# Patient Record
Sex: MALE | Race: WHITE | NOT HISPANIC OR LATINO | URBAN - METROPOLITAN AREA
[De-identification: names, ages, dates, MRNs, and addresses within clinical notes are randomized per-mention and may not be internally consistent; named-entity substitution may affect disease eponyms.]

---

## 2017-03-22 ENCOUNTER — INPATIENT (INPATIENT)
Facility: HOSPITAL | Age: 82
LOS: 5 days | Discharge: HOME | DRG: 193 | End: 2017-03-28
Attending: INTERNAL MEDICINE | Admitting: INTERNAL MEDICINE
Payer: MEDICARE

## 2017-03-22 VITALS
HEART RATE: 98 BPM | SYSTOLIC BLOOD PRESSURE: 129 MMHG | RESPIRATION RATE: 18 BRPM | OXYGEN SATURATION: 95 % | DIASTOLIC BLOOD PRESSURE: 75 MMHG

## 2017-03-22 DIAGNOSIS — E78.5 HYPERLIPIDEMIA, UNSPECIFIED: ICD-10-CM

## 2017-03-22 DIAGNOSIS — J10.1 INFLUENZA DUE TO OTHER IDENTIFIED INFLUENZA VIRUS WITH OTHER RESPIRATORY MANIFESTATIONS: ICD-10-CM

## 2017-03-22 DIAGNOSIS — Z29.9 ENCOUNTER FOR PROPHYLACTIC MEASURES, UNSPECIFIED: ICD-10-CM

## 2017-03-22 DIAGNOSIS — N17.9 ACUTE KIDNEY FAILURE, UNSPECIFIED: ICD-10-CM

## 2017-03-22 DIAGNOSIS — Z71.89 OTHER SPECIFIED COUNSELING: ICD-10-CM

## 2017-03-22 DIAGNOSIS — R63.8 OTHER SYMPTOMS AND SIGNS CONCERNING FOOD AND FLUID INTAKE: ICD-10-CM

## 2017-03-22 DIAGNOSIS — F32.9 MAJOR DEPRESSIVE DISORDER, SINGLE EPISODE, UNSPECIFIED: ICD-10-CM

## 2017-03-22 DIAGNOSIS — E03.9 HYPOTHYROIDISM, UNSPECIFIED: ICD-10-CM

## 2017-03-22 PROCEDURE — 99285 EMERGENCY DEPT VISIT HI MDM: CPT | Mod: 25,GC

## 2017-03-22 PROCEDURE — 99497 ADVNCD CARE PLAN 30 MIN: CPT | Mod: 25

## 2017-03-22 PROCEDURE — 99223 1ST HOSP IP/OBS HIGH 75: CPT

## 2017-03-22 PROCEDURE — 93010 ELECTROCARDIOGRAM REPORT: CPT

## 2017-03-22 PROCEDURE — 71020: CPT | Mod: 26

## 2017-03-22 RX ORDER — DOCUSATE SODIUM 100 MG
100 CAPSULE ORAL THREE TIMES A DAY
Qty: 0 | Refills: 0 | Status: DISCONTINUED | OUTPATIENT
Start: 2017-03-22 | End: 2017-03-28

## 2017-03-22 RX ORDER — DIGOXIN 250 MCG
0.12 TABLET ORAL DAILY
Qty: 0 | Refills: 0 | Status: DISCONTINUED | OUTPATIENT
Start: 2017-03-22 | End: 2017-03-28

## 2017-03-22 RX ORDER — SODIUM CHLORIDE 9 MG/ML
500 INJECTION INTRAMUSCULAR; INTRAVENOUS; SUBCUTANEOUS ONCE
Qty: 0 | Refills: 0 | Status: COMPLETED | OUTPATIENT
Start: 2017-03-22 | End: 2017-03-22

## 2017-03-22 RX ORDER — SENNA PLUS 8.6 MG/1
2 TABLET ORAL AT BEDTIME
Qty: 0 | Refills: 0 | Status: DISCONTINUED | OUTPATIENT
Start: 2017-03-22 | End: 2017-03-28

## 2017-03-22 RX ORDER — LEVOTHYROXINE SODIUM 125 MCG
125 TABLET ORAL DAILY
Qty: 0 | Refills: 0 | Status: DISCONTINUED | OUTPATIENT
Start: 2017-03-22 | End: 2017-03-28

## 2017-03-22 RX ORDER — IPRATROPIUM/ALBUTEROL SULFATE 18-103MCG
3 AEROSOL WITH ADAPTER (GRAM) INHALATION EVERY 6 HOURS
Qty: 0 | Refills: 0 | Status: DISCONTINUED | OUTPATIENT
Start: 2017-03-22 | End: 2017-03-28

## 2017-03-22 RX ORDER — HEPARIN SODIUM 5000 [USP'U]/ML
5000 INJECTION INTRAVENOUS; SUBCUTANEOUS EVERY 12 HOURS
Qty: 0 | Refills: 0 | Status: DISCONTINUED | OUTPATIENT
Start: 2017-03-22 | End: 2017-03-28

## 2017-03-22 RX ORDER — ACETAMINOPHEN 500 MG
1000 TABLET ORAL ONCE
Qty: 0 | Refills: 0 | Status: COMPLETED | OUTPATIENT
Start: 2017-03-22 | End: 2017-03-22

## 2017-03-22 RX ORDER — LATANOPROST 0.05 MG/ML
1 SOLUTION/ DROPS OPHTHALMIC; TOPICAL AT BEDTIME
Qty: 0 | Refills: 0 | Status: DISCONTINUED | OUTPATIENT
Start: 2017-03-22 | End: 2017-03-28

## 2017-03-22 RX ORDER — ACETAMINOPHEN 500 MG
650 TABLET ORAL EVERY 6 HOURS
Qty: 0 | Refills: 0 | Status: DISCONTINUED | OUTPATIENT
Start: 2017-03-22 | End: 2017-03-28

## 2017-03-22 RX ORDER — PANTOPRAZOLE SODIUM 20 MG/1
40 TABLET, DELAYED RELEASE ORAL DAILY
Qty: 0 | Refills: 0 | Status: DISCONTINUED | OUTPATIENT
Start: 2017-03-22 | End: 2017-03-28

## 2017-03-22 RX ADMIN — Medication 75 MILLIGRAM(S): at 18:42

## 2017-03-22 RX ADMIN — Medication 400 MILLIGRAM(S): at 17:05

## 2017-03-22 RX ADMIN — SODIUM CHLORIDE 1000 MILLILITER(S): 9 INJECTION INTRAMUSCULAR; INTRAVENOUS; SUBCUTANEOUS at 17:05

## 2017-03-22 NOTE — ED ADULT NURSE REASSESSMENT NOTE - NS ED NURSE REASSESS COMMENT FT1
Recvd pt awake, alert and responsive to all stimuli.  tracheostomy to room air in place; no sob or respiratory distress noted.  pt denies any pain and is resting comfortably in bed awaiting admitting droplet isolation bed.  Will continue to monitor.

## 2017-03-22 NOTE — H&P ADULT. - OTHER
TTE 2014:   EF 65  onclusions:  1. Mitral annular calcification, otherwise normal mitral  valve. Minimal mitral regurgitation.  2. Calcified trileaflet aortic valve with normal opening.  No aortic valve regurgitation seen.  3. Mild aortic root dilatation  (Ao: 4.1 cm at the sinuses  of Valsalva).  4. Increased relative wall thickness with normal left  ventricular mass index, consistent with concentric left  ventricular remodeling.  5. Endocardium not well visualized; grossly normal left  ventricular systolic function.  6. Reversal of the E-A  waves of the mitral inflow pattern  is consistent with diastolic LV dysfunction.  7. Normal right ventricular size and function.

## 2017-03-22 NOTE — ED PROVIDER NOTE - OBJECTIVE STATEMENT
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 Per patient and family, patient has had thicker secretions and has not been able to clear them as well recently. States today that his aid tried both compressor and suction and a nebulizer treatment, but nothing would break up secretion. Per family, patient appears much better now with better color and speech. Denies any recent fever, chills, CP, N/V/D, numbness, paresthesias. trach last changed today. patients normal O2 sats at home at 97% on room air.  Pulm: Dr. Morin  PMD: Dr. Babb, Sulaiman  Dr. 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 Per patient and family, patient has had thicker secretions and has not been able to clear them as well recently. States today that his aid tried both compressor and suction and a nebulizer treatment, but nothing would break up secretion. Per family, patient appears much better now with better color and speech. Denies any recent fever, chills, CP, N/V/D, numbness, paresthesias. trach last changed today. patients normal O2 sats at home at 97% on room air. Recently finished Abx 1 week ago for cough.  Pulm: Dr. Morin  PMD: Dr. Babb, Sulaiman  Dr. Chen (ENT)

## 2017-03-22 NOTE — ED PROVIDER NOTE - MUSCULOSKELETAL, MLM
Globally decreased strength. Full active range of motion of all 4 extremities. Able to pull self from lying to sitting without assistance

## 2017-03-22 NOTE — ED PROVIDER NOTE - BREATH SOUNDS
dec breath sounds over R lung - no inspiratory wheezes w/ coarse expiration throughout all lobes - occasional dense productive cough during exam that patient can't clear

## 2017-03-22 NOTE — H&P ADULT. - PROBLEM SELECTOR PLAN 6
--patient confirms DNR, wants time to think about DNI and discuss with daughter  --30 minutes advanced care planning spent --patient confirms DNR, wants time to think about DNI and discuss with daughter  --patient is AOx3, has capacity to make this decision  --30 minutes advanced care planning spent

## 2017-03-22 NOTE — H&P ADULT. - NEGATIVE CARDIOVASCULAR SYMPTOMS
no orthopnea/no paroxysmal nocturnal dyspnea/no peripheral edema/no dyspnea on exertion/no chest pain/no palpitations

## 2017-03-22 NOTE — H&P ADULT. - ASSESSMENT
This is a 89 year old gentleman with hypothyroidism, HTN, CHF, colon resection, laryngeal Ca s/p XRT, vocal cord surgery and trach and bladder ca s/p chemotherapy, pulmonary embolism 13 years ago treated with AC for one year presenting with shortness of breath x 24 hours, found to be influenza positive.

## 2017-03-22 NOTE — H&P ADULT. - HISTORY OF PRESENT ILLNESS
This is a 89 year old gentleman with hypothyroidism, HTN, CHF, colon resection, laryngeal Ca s/p XRT, vocal cord surgery and trach and bladder ca s/p chemotherapy, pulmonary embolism 13 years ago treated with AC for one year presenting with shortness of breath x 24 hours.  Per patient and family, patient has had thicker secretions and has not been able to clear them as well recently. States today that his aid tried both compressor and suction and a nebulizer treatment, but nothing would break up secretion. Per family, patient appears much better now with better color and speech. Denies any recent fever, chills, CP, N/V/D, numbness, paresthesias. trach last changed today. patients normal O2 sats at home at 97% on room air. Recently finished Abx 1 week ago for cough.    In ED, HR 80-90, 110-130/60-70, afebrile, initially satting well on RA.  Labs notable for WBC 4.3, Plt 132, INR 1.19, Na 139, BUN 33, Cr 1.91, , VBG 7.37/47.  UA unremarkable, RVP + for flu.  CXR with tortuous trachea, lung fields clear.  Patient received oseltamivir, 500cc NS and IV tylenol. This is a 89 year old gentleman with hypothyroidism, HTN, CHF, colon resection, laryngeal Ca s/p XRT, vocal cord surgery and trach and bladder ca s/p chemotherapy, pulmonary embolism 13 years ago treated with AC for one year presenting with shortness of breath x 24 hours.  Patient notes that he began having symptoms of mild SOB and increased secretions 5 days PTA.  He notes a cough productive of clear sputum, though also notes that it has become more difficult to clear his secretions over the past 2 days.  At home, he tried compressor, suction and nebulizer treatments without improvement.  He denies HA, fevers, chills, chest pain, palpitations, diarrhea, constipation, rash. Notes that he has continued to take good POs.  No sick contacts.  Patient lives with long time girlfriend, also has [employer]HA 5 days a week to help with tach management and chores.  Independent in ADLs.     On arrival to patient, EMS found him to be satting 84 on RA.  Advanced to NRB.      In ED, HR 80-90, 110-130/60-70, afebrile, initially satting well on RA.  Labs notable for WBC 4.3, Plt 132, INR 1.19, Na 139, BUN 33, Cr 1.91, , VBG 7.37/47.  UA unremarkable, RVP + for flu.  CXR with tortuous trachea, lung fields clear.  Patient received oseltamivir, 500cc NS and IV tylenol.

## 2017-03-22 NOTE — ED PROVIDER NOTE - MEDICAL DECISION MAKING DETAILS
Jovon resident: 89 y/o trach patient 2/2 to radiation, HLD, Hypothyroid p/w SOB 2/2 to unable to clear secretions - found to be febrile here to 101 rectally - saturating 89% on room air - will suction, culture, labs, CXR, nebs, tylenol, and likely admit for URI vs pneumonia Marilla resident: 89 y/o trach patient 2/2 to radiation, HLD, Hypothyroid p/w SOB 2/2 to unable to clear secretions - found to be febrile here to 101 rectally - saturating 89% on room air - will suction, culture, labs, CXR, nebs, tylenol, and likely admit for URI vs pneumonia    Attending MD King: 89 male with trach sp radiation damage sp vocal cord cancer 3 years ago.  Presented to ED with difficulty breathing a fever since yesterday.  No recent hospitalization.  On exam the trach is intact however, wet secreations, lungs sound clear. No LE edema.  Rule tracheal infection vs PNA vs viral illness, pan culture, ENT consult. Likely admit.

## 2017-03-22 NOTE — ED PROVIDER NOTE - ATTENDING CONTRIBUTION TO CARE
Attending MD King:  I personally have seen and examined this patient.  Resident note reviewed and agree on plan of care and except where noted.  See MDM for details.

## 2017-03-22 NOTE — H&P ADULT. - PROBLEM SELECTOR PLAN 2
--suspect pre-renal in setting of infection  --careful fluids as above given known diastolic dysfunction.  Dry on exam.  --trend daily Cr  --daily weights, strict I/O, avoid nephrotoxins

## 2017-03-22 NOTE — H&P ADULT. - PROBLEM SELECTOR PLAN 1
--patient has had symptoms for >48 hours, unlikely to benefit from tamiflu. Given hospitalziation and desaturation, will give course.    --continue symptoms control with PRN tylenol  --guaifanesin to assist with clearance of secretions  --vigorous pulmonary toilet  --duonebs q6h scheduled for now  --NS @ 125 overnight for a total of 1L

## 2017-03-22 NOTE — H&P ADULT. - LAB RESULTS AND INTERPRETATION
Labs personally reviewed.   Labs notable for WBC 4.3, Plt 132, INR 1.19, Na 139, BUN 33, Cr 1.91, , VBG 7.37/47.  UA unremarkable, RVP + for flu.

## 2017-03-23 ENCOUNTER — TRANSCRIPTION ENCOUNTER (OUTPATIENT)
Age: 82
End: 2017-03-23

## 2017-03-23 PROCEDURE — 99222 1ST HOSP IP/OBS MODERATE 55: CPT

## 2017-03-23 RX ORDER — ACETAMINOPHEN 500 MG
650 TABLET ORAL ONCE
Qty: 0 | Refills: 0 | Status: COMPLETED | OUTPATIENT
Start: 2017-03-23 | End: 2017-03-23

## 2017-03-23 RX ORDER — IPRATROPIUM/ALBUTEROL SULFATE 18-103MCG
3 AEROSOL WITH ADAPTER (GRAM) INHALATION ONCE
Qty: 0 | Refills: 0 | Status: COMPLETED | OUTPATIENT
Start: 2017-03-23 | End: 2017-03-23

## 2017-03-23 RX ORDER — SODIUM CHLORIDE 9 MG/ML
1000 INJECTION INTRAMUSCULAR; INTRAVENOUS; SUBCUTANEOUS
Qty: 0 | Refills: 0 | Status: DISCONTINUED | OUTPATIENT
Start: 2017-03-23 | End: 2017-03-23

## 2017-03-23 RX ADMIN — Medication 100 MILLIGRAM(S): at 06:45

## 2017-03-23 RX ADMIN — Medication 100 MILLIGRAM(S): at 00:57

## 2017-03-23 RX ADMIN — Medication 1 DROP(S): at 21:39

## 2017-03-23 RX ADMIN — Medication 100 MILLIGRAM(S): at 14:11

## 2017-03-23 RX ADMIN — Medication 3 MILLILITER(S): at 01:24

## 2017-03-23 RX ADMIN — Medication 30 MILLIGRAM(S): at 14:10

## 2017-03-23 RX ADMIN — Medication 125 MICROGRAM(S): at 06:45

## 2017-03-23 RX ADMIN — Medication 1 DROP(S): at 23:06

## 2017-03-23 RX ADMIN — Medication 100 MILLIGRAM(S): at 21:39

## 2017-03-23 RX ADMIN — SODIUM CHLORIDE 125 MILLILITER(S): 9 INJECTION INTRAMUSCULAR; INTRAVENOUS; SUBCUTANEOUS at 06:44

## 2017-03-23 RX ADMIN — HEPARIN SODIUM 5000 UNIT(S): 5000 INJECTION INTRAVENOUS; SUBCUTANEOUS at 06:45

## 2017-03-23 RX ADMIN — Medication 3 MILLILITER(S): at 00:57

## 2017-03-23 RX ADMIN — Medication 3 MILLILITER(S): at 06:45

## 2017-03-23 RX ADMIN — LATANOPROST 1 DROP(S): 0.05 SOLUTION/ DROPS OPHTHALMIC; TOPICAL at 00:58

## 2017-03-23 RX ADMIN — Medication 3 MILLILITER(S): at 23:06

## 2017-03-23 RX ADMIN — Medication 650 MILLIGRAM(S): at 01:24

## 2017-03-23 RX ADMIN — Medication 650 MILLIGRAM(S): at 02:19

## 2017-03-23 RX ADMIN — Medication 1 DROP(S): at 00:58

## 2017-03-23 RX ADMIN — Medication 3 MILLILITER(S): at 14:09

## 2017-03-23 RX ADMIN — Medication 20 MILLIGRAM(S): at 14:09

## 2017-03-23 RX ADMIN — Medication 0.12 MILLIGRAM(S): at 06:45

## 2017-03-23 RX ADMIN — Medication 3 MILLILITER(S): at 17:52

## 2017-03-23 RX ADMIN — LATANOPROST 1 DROP(S): 0.05 SOLUTION/ DROPS OPHTHALMIC; TOPICAL at 21:39

## 2017-03-23 RX ADMIN — Medication 1 DROP(S): at 14:09

## 2017-03-23 RX ADMIN — HEPARIN SODIUM 5000 UNIT(S): 5000 INJECTION INTRAVENOUS; SUBCUTANEOUS at 17:52

## 2017-03-23 RX ADMIN — Medication 1 DROP(S): at 17:52

## 2017-03-23 RX ADMIN — PANTOPRAZOLE SODIUM 40 MILLIGRAM(S): 20 TABLET, DELAYED RELEASE ORAL at 14:11

## 2017-03-23 NOTE — DISCHARGE NOTE ADULT - PATIENT PORTAL LINK FT
“You can access the [facility] Patient Portal, offered by [facility], by registering with the following website: [email or web address]”

## 2017-03-23 NOTE — DISCHARGE NOTE ADULT - CARE PROVIDER_API CALL
James Morin (DO), Critical Care Medicine; Internal Medicine; Pulmonary Disease  [address] [address]  Phone: [phone]  Fax: [phone]

## 2017-03-23 NOTE — DISCHARGE NOTE ADULT - HOME CARE AGENCY
[facility] [phone] to arrive in home 24-72 hours from hospital discharge date.

## 2017-03-23 NOTE — DISCHARGE NOTE ADULT - ADDITIONAL INSTRUCTIONS
Follow up with your primary care physician in one week .  Take with you the discharge summary. Follow up with your primary care physician in one week. Take with you the discharge summary.  Follow up with Pulmonologist in 1-2 weeks

## 2017-03-23 NOTE — DISCHARGE NOTE ADULT - NS AS DC HF EDUCATION INSTRUCTIONS
Report weight gain of 2 or more pounds in one day or 3 or more pounds in one week, worsening shortness of breath, fatigue, weakness, increased swelling of hands and feet to primary care provider/Monitor Weight Daily/Low salt diet/Call Primary Care Provider for follow-up after discharge/Activities as tolerated

## 2017-03-23 NOTE — DISCHARGE NOTE ADULT - PLAN OF CARE
Resolving Continue with tamiflu as prescribed for 3 more days   follow up with your primary care physician in one week.  May take Robitussin to assist with clearing the secretion Continue with Paxil as prescribed  you do not make enough thyroid hormone  signs & symptoms of low levels of thyroid hormone - tired, getting cold easily, coarse or thin hair, constipation, shortness of breath, swelling, irregular periods  your doctor will do thyroid hormone blood tests at least once a year to monitor if medication dose is adequate  take your thyroid medicine as directed by your doctor & on empty stomach Continue with prescribed dose of synthroid  you do not make enough thyroid hormone  signs & symptoms of low levels of thyroid hormone - tired, getting cold easily, coarse or thin hair, constipation, shortness of breath, swelling, irregular periods  your doctor will do thyroid hormone blood tests at least once a year to monitor if medication dose is adequate  take your thyroid medicine as directed by your doctor & on empty stomach Monitor your kidney function in one week at your primary care physicians office.  Avoid toxic medication that can have harmful effects on your kidney.  Avoid over the counter medication .  Medication has to be doses by your physician based on your kidney function Continue with tamiflu as prescribed for 3 more days   follow up with your primary care physician in one week.  May take Robitussin to assist with clearing the secretion  Nebulization as needed and tracheal and oral suctioning as needed. Continue with Paxil as prescribed Continue with prescribed dose of synthroid  you do not make enough thyroid hormone  signs & symptoms of low levels of thyroid hormone - tired, getting cold easily, coarse or thin hair, constipation, shortness of breath, swelling  your doctor will do thyroid hormone blood tests at least once a year to monitor if medication dose is adequate  take your thyroid medicine as directed by your doctor & on empty stomach Follow up with your primary care physician in one week  May take Robitussin to assist with clearing the secretions Take medications for your blood pressure as recommended.  Eat a heart healthy diet that is low in saturated fats and salt, and includes whole grains, fruits, vegetables and lean protein   Exercise regularly (consult with your physician or cardiologist first); maintain a heart healthy weight.   If you smoke - quit (A resource to help you stop smoking is the [facility] – phone number [phone].). Continue to follow with your primary physician or cardiologist.   Seek medical help for dizziness, Lightheadedness, Blurry vision, Headache, Chest pain, Shortness of breath  Follow up with your medical doctor to establish long term blood pressure treatment goals. Call your Health Care provider upon arrival home to make a follow up appointment within one week.  Take all inhalers as prescribed by your Health Care Provider.  Take steroids as prescribed by your Health Care Provider.  If your cough increases infrequency and severity and/or you have shortness of breath or increased shortness of breath call your Health Care Provider.  If you develop fever, chills, night sweats, malaise, and/or change in mental status call your Health care Provider.  Nutrition is very important.  Eat small frequent meals.  Increase your activity as tolerated.  Do not stay in bed all day

## 2017-03-23 NOTE — DISCHARGE NOTE ADULT - CARE PLAN
Principal Discharge DX:	Influenza A  Goal:	Resolving  Instructions for follow-up, activity and diet:	Continue with tamiflu as prescribed for 3 more days   follow up with your primary care physician in one week.  May take Robitussin to assist with clearing the secretion  Secondary Diagnosis:	Depression  Instructions for follow-up, activity and diet:	Continue with Paxil as prescribed  you do not make enough thyroid hormone  signs & symptoms of low levels of thyroid hormone - tired, getting cold easily, coarse or thin hair, constipation, shortness of breath, swelling, irregular periods  your doctor will do thyroid hormone blood tests at least once a year to monitor if medication dose is adequate  take your thyroid medicine as directed by your doctor & on empty stomach  Secondary Diagnosis:	Acquired hypothyroidism  Instructions for follow-up, activity and diet:	Continue with prescribed dose of synthroid  you do not make enough thyroid hormone  signs & symptoms of low levels of thyroid hormone - tired, getting cold easily, coarse or thin hair, constipation, shortness of breath, swelling, irregular periods  your doctor will do thyroid hormone blood tests at least once a year to monitor if medication dose is adequate  take your thyroid medicine as directed by your doctor & on empty stomach Principal Discharge DX:	Influenza A  Goal:	Resolving  Instructions for follow-up, activity and diet:	Continue with tamiflu as prescribed for 3 more days   follow up with your primary care physician in one week.  May take Robitussin to assist with clearing the secretion  Nebulization as needed and tracheal and oral suctioning as needed.  Secondary Diagnosis:	Depression  Instructions for follow-up, activity and diet:	Continue with Paxil as prescribed  you do not make enough thyroid hormone  signs & symptoms of low levels of thyroid hormone - tired, getting cold easily, coarse or thin hair, constipation, shortness of breath, swelling, irregular periods  your doctor will do thyroid hormone blood tests at least once a year to monitor if medication dose is adequate  take your thyroid medicine as directed by your doctor & on empty stomach  Secondary Diagnosis:	Acquired hypothyroidism  Instructions for follow-up, activity and diet:	Continue with prescribed dose of synthroid  you do not make enough thyroid hormone  signs & symptoms of low levels of thyroid hormone - tired, getting cold easily, coarse or thin hair, constipation, shortness of breath, swelling, irregular periods  your doctor will do thyroid hormone blood tests at least once a year to monitor if medication dose is adequate  take your thyroid medicine as directed by your doctor & on empty stomach  Secondary Diagnosis:	JASON (acute kidney injury)  Instructions for follow-up, activity and diet:	Monitor your kidney function in one week at your primary care physicians office.  Avoid toxic medication that can have harmful effects on your kidney.  Avoid over the counter medication .  Medication has to be doses by your physician based on your kidney function Principal Discharge DX:	Influenza A  Goal:	Resolving  Instructions for follow-up, activity and diet:	Follow up with your primary care physician in one week  May take Robitussin to assist with clearing the secretions  Secondary Diagnosis:	Depression  Instructions for follow-up, activity and diet:	Continue with Paxil as prescribed  Secondary Diagnosis:	Acquired hypothyroidism  Instructions for follow-up, activity and diet:	Continue with prescribed dose of synthroid  you do not make enough thyroid hormone  signs & symptoms of low levels of thyroid hormone - tired, getting cold easily, coarse or thin hair, constipation, shortness of breath, swelling  your doctor will do thyroid hormone blood tests at least once a year to monitor if medication dose is adequate  take your thyroid medicine as directed by your doctor & on empty stomach  Secondary Diagnosis:	JASON (acute kidney injury)  Instructions for follow-up, activity and diet:	Monitor your kidney function in one week at your primary care physicians office.  Avoid toxic medication that can have harmful effects on your kidney.  Avoid over the counter medication .  Medication has to be doses by your physician based on your kidney function Principal Discharge DX:	Influenza A  Goal:	Resolving  Instructions for follow-up, activity and diet:	Follow up with your primary care physician in one week  May take Robitussin to assist with clearing the secretions  Secondary Diagnosis:	COPD exacerbation  Instructions for follow-up, activity and diet:	Call your Health Care provider upon arrival home to make a follow up appointment within one week.  Take all inhalers as prescribed by your Health Care Provider.  Take steroids as prescribed by your Health Care Provider.  If your cough increases infrequency and severity and/or you have shortness of breath or increased shortness of breath call your Health Care Provider.  If you develop fever, chills, night sweats, malaise, and/or change in mental status call your Health care Provider.  Nutrition is very important.  Eat small frequent meals.  Increase your activity as tolerated.  Do not stay in bed all day  Secondary Diagnosis:	JASON (acute kidney injury)  Instructions for follow-up, activity and diet:	Monitor your kidney function in one week at your primary care physicians office.  Avoid toxic medication that can have harmful effects on your kidney.  Avoid over the counter medication .  Medication has to be doses by your physician based on your kidney function  Secondary Diagnosis:	Essential hypertension  Instructions for follow-up, activity and diet:	Take medications for your blood pressure as recommended.  Eat a heart healthy diet that is low in saturated fats and salt, and includes whole grains, fruits, vegetables and lean protein   Exercise regularly (consult with your physician or cardiologist first); maintain a heart healthy weight.   If you smoke - quit (A resource to help you stop smoking is the [facility] Tobacco [facility] – phone number [phone].). Continue to follow with your primary physician or cardiologist.   Seek medical help for dizziness, Lightheadedness, Blurry vision, Headache, Chest pain, Shortness of breath  Follow up with your medical doctor to establish long term blood pressure treatment goals. Principal Discharge DX:	Influenza A  Goal:	Resolving  Instructions for follow-up, activity and diet:	Follow up with your primary care physician in one week  May take Robitussin to assist with clearing the secretions  Secondary Diagnosis:	COPD exacerbation  Instructions for follow-up, activity and diet:	Call your Health Care provider upon arrival home to make a follow up appointment within one week.  Take all inhalers as prescribed by your Health Care Provider.  Take steroids as prescribed by your Health Care Provider.  If your cough increases infrequency and severity and/or you have shortness of breath or increased shortness of breath call your Health Care Provider.  If you develop fever, chills, night sweats, malaise, and/or change in mental status call your Health care Provider.  Nutrition is very important.  Eat small frequent meals.  Increase your activity as tolerated.  Do not stay in bed all day  Secondary Diagnosis:	JASON (acute kidney injury)  Instructions for follow-up, activity and diet:	Monitor your kidney function in one week at your primary care physicians office.  Avoid toxic medication that can have harmful effects on your kidney.  Avoid over the counter medication .  Medication has to be doses by your physician based on your kidney function  Secondary Diagnosis:	Essential hypertension  Instructions for follow-up, activity and diet:	Take medications for your blood pressure as recommended.  Eat a heart healthy diet that is low in saturated fats and salt, and includes whole grains, fruits, vegetables and lean protein   Exercise regularly (consult with your physician or cardiologist first); maintain a heart healthy weight.   If you smoke - quit (A resource to help you stop smoking is the [facility] Tobacco [facility] – phone number 167-523-4317.). Continue to follow with your primary physician or cardiologist.   Seek medical help for dizziness, Lightheadedness, Blurry vision, Headache, Chest pain, Shortness of breath  Follow up with your medical doctor to establish long term blood pressure treatment goals. Principal Discharge DX:	Influenza A  Goal:	Resolving  Instructions for follow-up, activity and diet:	Follow up with your primary care physician in one week  May take Robitussin to assist with clearing the secretions  Secondary Diagnosis:	COPD exacerbation  Instructions for follow-up, activity and diet:	Call your Health Care provider upon arrival home to make a follow up appointment within one week.  Take all inhalers as prescribed by your Health Care Provider.  Take steroids as prescribed by your Health Care Provider.  If your cough increases infrequency and severity and/or you have shortness of breath or increased shortness of breath call your Health Care Provider.  If you develop fever, chills, night sweats, malaise, and/or change in mental status call your Health care Provider.  Nutrition is very important.  Eat small frequent meals.  Increase your activity as tolerated.  Do not stay in bed all day  Secondary Diagnosis:	JASON (acute kidney injury)  Instructions for follow-up, activity and diet:	Monitor your kidney function in one week at your primary care physicians office.  Avoid toxic medication that can have harmful effects on your kidney.  Avoid over the counter medication .  Medication has to be doses by your physician based on your kidney function  Secondary Diagnosis:	Essential hypertension  Instructions for follow-up, activity and diet:	Take medications for your blood pressure as recommended.  Eat a heart healthy diet that is low in saturated fats and salt, and includes whole grains, fruits, vegetables and lean protein   Exercise regularly (consult with your physician or cardiologist first); maintain a heart healthy weight.   If you smoke - quit (A resource to help you stop smoking is the [facility] Tobacco [facility] – phone number [phone].). Continue to follow with your primary physician or cardiologist.   Seek medical help for dizziness, Lightheadedness, Blurry vision, Headache, Chest pain, Shortness of breath  Follow up with your medical doctor to establish long term blood pressure treatment goals. Principal Discharge DX:	Influenza A  Goal:	Resolving  Instructions for follow-up, activity and diet:	Follow up with your primary care physician in one week  May take Robitussin to assist with clearing the secretions  Secondary Diagnosis:	COPD exacerbation  Instructions for follow-up, activity and diet:	Call your Health Care provider upon arrival home to make a follow up appointment within one week.  Take all inhalers as prescribed by your Health Care Provider.  Take steroids as prescribed by your Health Care Provider.  If your cough increases infrequency and severity and/or you have shortness of breath or increased shortness of breath call your Health Care Provider.  If you develop fever, chills, night sweats, malaise, and/or change in mental status call your Health care Provider.  Nutrition is very important.  Eat small frequent meals.  Increase your activity as tolerated.  Do not stay in bed all day  Secondary Diagnosis:	JASON (acute kidney injury)  Instructions for follow-up, activity and diet:	Monitor your kidney function in one week at your primary care physicians office.  Avoid toxic medication that can have harmful effects on your kidney.  Avoid over the counter medication .  Medication has to be doses by your physician based on your kidney function  Secondary Diagnosis:	Essential hypertension  Instructions for follow-up, activity and diet:	Take medications for your blood pressure as recommended.  Eat a heart healthy diet that is low in saturated fats and salt, and includes whole grains, fruits, vegetables and lean protein   Exercise regularly (consult with your physician or cardiologist first); maintain a heart healthy weight.   If you smoke - quit (A resource to help you stop smoking is the [facility] – phone number [phone].). Continue to follow with your primary physician or cardiologist.   Seek medical help for dizziness, Lightheadedness, Blurry vision, Headache, Chest pain, Shortness of breath  Follow up with your medical doctor to establish long term blood pressure treatment goals. Principal Discharge DX:	Influenza A  Goal:	Resolving  Instructions for follow-up, activity and diet:	Follow up with your primary care physician in one week  May take Robitussin to assist with clearing the secretions  Secondary Diagnosis:	COPD exacerbation  Instructions for follow-up, activity and diet:	Call your Health Care provider upon arrival home to make a follow up appointment within one week.  Take all inhalers as prescribed by your Health Care Provider.  Take steroids as prescribed by your Health Care Provider.  If your cough increases infrequency and severity and/or you have shortness of breath or increased shortness of breath call your Health Care Provider.  If you develop fever, chills, night sweats, malaise, and/or change in mental status call your Health care Provider.  Nutrition is very important.  Eat small frequent meals.  Increase your activity as tolerated.  Do not stay in bed all day  Secondary Diagnosis:	JASON (acute kidney injury)  Instructions for follow-up, activity and diet:	Monitor your kidney function in one week at your primary care physicians office.  Avoid toxic medication that can have harmful effects on your kidney.  Avoid over the counter medication .  Medication has to be doses by your physician based on your kidney function  Secondary Diagnosis:	Essential hypertension  Instructions for follow-up, activity and diet:	Take medications for your blood pressure as recommended.  Eat a heart healthy diet that is low in saturated fats and salt, and includes whole grains, fruits, vegetables and lean protein   Exercise regularly (consult with your physician or cardiologist first); maintain a heart healthy weight.   If you smoke - quit (A resource to help you stop smoking is the [facility] – phone number [phone].). Continue to follow with your primary physician or cardiologist.   Seek medical help for dizziness, Lightheadedness, Blurry vision, Headache, Chest pain, Shortness of breath  Follow up with your medical doctor to establish long term blood pressure treatment goals. Principal Discharge DX:	Influenza A  Goal:	Resolving  Instructions for follow-up, activity and diet:	Follow up with your primary care physician in one week  May take Robitussin to assist with clearing the secretions  Secondary Diagnosis:	COPD exacerbation  Instructions for follow-up, activity and diet:	Call your Health Care provider upon arrival home to make a follow up appointment within one week.  Take all inhalers as prescribed by your Health Care Provider.  Take steroids as prescribed by your Health Care Provider.  If your cough increases infrequency and severity and/or you have shortness of breath or increased shortness of breath call your Health Care Provider.  If you develop fever, chills, night sweats, malaise, and/or change in mental status call your Health care Provider.  Nutrition is very important.  Eat small frequent meals.  Increase your activity as tolerated.  Do not stay in bed all day  Secondary Diagnosis:	JASON (acute kidney injury)  Instructions for follow-up, activity and diet:	Monitor your kidney function in one week at your primary care physicians office.  Avoid toxic medication that can have harmful effects on your kidney.  Avoid over the counter medication .  Medication has to be doses by your physician based on your kidney function  Secondary Diagnosis:	Essential hypertension  Instructions for follow-up, activity and diet:	Take medications for your blood pressure as recommended.  Eat a heart healthy diet that is low in saturated fats and salt, and includes whole grains, fruits, vegetables and lean protein   Exercise regularly (consult with your physician or cardiologist first); maintain a heart healthy weight.   If you smoke - quit (A resource to help you stop smoking is the [facility] – phone number [phone].). Continue to follow with your primary physician or cardiologist.   Seek medical help for dizziness, Lightheadedness, Blurry vision, Headache, Chest pain, Shortness of breath  Follow up with your medical doctor to establish long term blood pressure treatment goals. Principal Discharge DX:	Influenza A  Goal:	Resolving  Instructions for follow-up, activity and diet:	Follow up with your primary care physician in one week  May take Robitussin to assist with clearing the secretions  Secondary Diagnosis:	COPD exacerbation  Instructions for follow-up, activity and diet:	Call your Health Care provider upon arrival home to make a follow up appointment within one week.  Take all inhalers as prescribed by your Health Care Provider.  Take steroids as prescribed by your Health Care Provider.  If your cough increases infrequency and severity and/or you have shortness of breath or increased shortness of breath call your Health Care Provider.  If you develop fever, chills, night sweats, malaise, and/or change in mental status call your Health care Provider.  Nutrition is very important.  Eat small frequent meals.  Increase your activity as tolerated.  Do not stay in bed all day  Secondary Diagnosis:	JASON (acute kidney injury)  Instructions for follow-up, activity and diet:	Monitor your kidney function in one week at your primary care physicians office.  Avoid toxic medication that can have harmful effects on your kidney.  Avoid over the counter medication .  Medication has to be doses by your physician based on your kidney function  Secondary Diagnosis:	Essential hypertension  Instructions for follow-up, activity and diet:	Take medications for your blood pressure as recommended.  Eat a heart healthy diet that is low in saturated fats and salt, and includes whole grains, fruits, vegetables and lean protein   Exercise regularly (consult with your physician or cardiologist first); maintain a heart healthy weight.   If you smoke - quit (A resource to help you stop smoking is the [facility] – phone number [phone].). Continue to follow with your primary physician or cardiologist.   Seek medical help for dizziness, Lightheadedness, Blurry vision, Headache, Chest pain, Shortness of breath  Follow up with your medical doctor to establish long term blood pressure treatment goals.

## 2017-03-23 NOTE — DISCHARGE NOTE ADULT - NS AS DC FOLLOWUP STROKE INST
Heart Failure/Influenza vaccination ([facility] Pub Date: August 19, 2014)/Smoking Cessation Heart Failure/Coumadin/Warfarin/Smoking Cessation Smoking Cessation/Heart Failure

## 2017-03-23 NOTE — DISCHARGE NOTE ADULT - MEDICATION SUMMARY - MEDICATIONS TO TAKE
I will START or STAY ON the medications listed below when I get home from the hospital:    predniSONE 20 mg oral tablet  -- 2 tab(s) by mouth once a day  -- Indication: For COPD exacerbation    digoxin 125 mcg (0.125 mg) oral tablet  -- 1 tab(s) by mouth once a day  -- Indication: For Essential hypertension    PARoxetine 20 mg oral tablet  -- 1 tab(s) by mouth once a day  -- Indication: For Depression    albuterol-ipratropium 2.5 mg-0.5 mg/3 mL inhalation solution  -- 3 milliliter(s) inhaled every 6 hours  -- Indication: For COPD exacerbation    senna oral tablet  -- 2 tab(s) by mouth once a day (at bedtime), As needed, Constipation  -- Indication: For COnstipation    docusate sodium 100 mg oral capsule  -- 1 cap(s) by mouth 3 times a day  -- Indication: For COnstipation    polyethylene glycol 3350 oral powder for reconstitution  -- 17 gram(s) by mouth 2 times a day  -- Indication: For COnstipation    ocular lubricant ophthalmic solution  -- 1 drop(s) to each affected eye every 3 hours  -- Indication: For glaucoma    latanoprost 0.005% ophthalmic solution  -- 1 drop(s) to each affected eye once a day (at bedtime)  -- Indication: For glaucoma    Protonix 40 mg oral granule, enteric coated  -- 1 each by mouth once a day via peg  -- Indication: For gerd    Synthroid 125 mcg (0.125 mg) oral tablet  -- 1 tab(s) by mouth once a day via peg  -- Indication: For Hypothyroidism    dextromethorphan-guaiFENesin 10 mg-100 mg/5 mL oral liquid  -- 10 milliliter(s) by mouth every 4 hours  -- Indication: For COugh I will START or STAY ON the medications listed below when I get home from the hospital:    predniSONE 20 mg oral tablet  -- 2 tab(s) by mouth once a day  -- Indication: For COPD exacerbation    digoxin 125 mcg (0.125 mg) oral tablet  -- 1 tab(s) by mouth every other day, odd days  -- Indication: For Arrythmia    digoxin 125 mcg (0.125 mg) oral tablet  -- 2 tab(s) by mouth every other day, even days  -- Indication: For Arrythmia    PARoxetine 20 mg oral tablet  -- 1 tab(s) by mouth once a day  -- Indication: For Depression    albuterol-ipratropium 2.5 mg-0.5 mg/3 mL inhalation solution  -- 3 milliliter(s) inhaled every 6 hours  -- Indication: For COPD exacerbation    polyethylene glycol 3350 oral powder for reconstitution  -- 17 gram(s) by mouth 2 times a day  -- Indication: For COnstipation    senna oral tablet  -- 2 tab(s) by mouth once a day (at bedtime), As needed, Constipation  -- Indication: For COnstipation    docusate sodium 100 mg oral capsule  -- 1 cap(s) by mouth 3 times a day  -- Indication: For COnstipation    Protonix 40 mg oral granule, enteric coated  -- 1 each by mouth once a day via peg  -- Indication: For gerd    Synthroid 125 mcg (0.125 mg) oral tablet  -- 1 tab(s) by mouth once a day via peg  -- Indication: For Hypothyroidism    dextromethorphan-guaiFENesin 10 mg-100 mg/5 mL oral liquid  -- 10 milliliter(s) by mouth every 4 hours  -- Indication: For COugh I will START or STAY ON the medications listed below when I get home from the hospital:    predniSONE 20 mg oral tablet  -- 2 tab(s) by mouth once a day  -- Indication: For COPD exacerbation    digoxin 125 mcg (0.125 mg) oral tablet  -- 1 tab(s) by mouth every other day, odd days  -- Indication: For Arrythmia    digoxin 125 mcg (0.125 mg) oral tablet  -- 2 tab(s) by mouth every other day, even days  -- Indication: For Arrythmia    PARoxetine 20 mg oral tablet  -- 1 tab(s) by mouth once a day  -- Indication: For Depression    albuterol-ipratropium 2.5 mg-0.5 mg/3 mL inhalation solution  -- 3 milliliter(s) inhaled every 6 hours  -- Indication: For COPD exacerbation    senna oral tablet  -- 2 tab(s) by mouth once a day (at bedtime), As needed, Constipation  -- Indication: For COnstipation    docusate sodium 100 mg oral capsule  -- 1 cap(s) by mouth 3 times a day  -- Indication: For COnstipation    polyethylene glycol 3350 oral powder for reconstitution  -- 17 gram(s) by mouth 2 times a day  -- Indication: For COnstipation    Synthroid 125 mcg (0.125 mg) oral tablet  -- 1 tab(s) by mouth once a day   -- Indication: For Hypothyroid    dextromethorphan-guaiFENesin 10 mg-100 mg/5 mL oral liquid  -- 10 milliliter(s) by mouth every 4 hours  -- Indication: For COugh

## 2017-03-24 LAB
ANION GAP SERPL CALC-SCNC: 10 MMOL/L — SIGNIFICANT CHANGE UP (ref 5–17)
BUN SERPL-MCNC: 27 MG/DL — HIGH (ref 7–23)
CALCIUM SERPL-MCNC: 8.6 MG/DL — SIGNIFICANT CHANGE UP (ref 8.4–10.5)
CHLORIDE SERPL-SCNC: 106 MMOL/L — SIGNIFICANT CHANGE UP (ref 96–108)
CO2 SERPL-SCNC: 25 MMOL/L — SIGNIFICANT CHANGE UP (ref 22–31)
CREAT SERPL-MCNC: 1.81 MG/DL — HIGH (ref 0.5–1.3)
CULTURE RESULTS: SIGNIFICANT CHANGE UP
GLUCOSE SERPL-MCNC: 95 MG/DL — SIGNIFICANT CHANGE UP (ref 70–99)
POTASSIUM SERPL-MCNC: 4.1 MMOL/L — SIGNIFICANT CHANGE UP (ref 3.5–5.3)
POTASSIUM SERPL-SCNC: 4.1 MMOL/L — SIGNIFICANT CHANGE UP (ref 3.5–5.3)
SODIUM SERPL-SCNC: 141 MMOL/L — SIGNIFICANT CHANGE UP (ref 135–145)
SPECIMEN SOURCE: SIGNIFICANT CHANGE UP

## 2017-03-24 PROCEDURE — 99232 SBSQ HOSP IP/OBS MODERATE 35: CPT

## 2017-03-24 RX ORDER — DIGOXIN 250 MCG
1 TABLET ORAL
Qty: 0 | Refills: 0 | COMMUNITY
Start: 2017-03-24

## 2017-03-24 RX ORDER — ACETAMINOPHEN 500 MG
650 TABLET ORAL ONCE
Qty: 0 | Refills: 0 | Status: COMPLETED | OUTPATIENT
Start: 2017-03-24 | End: 2017-03-24

## 2017-03-24 RX ORDER — DOCUSATE SODIUM 100 MG
1 CAPSULE ORAL
Qty: 0 | Refills: 0 | COMMUNITY
Start: 2017-03-24

## 2017-03-24 RX ORDER — SENNA PLUS 8.6 MG/1
2 TABLET ORAL
Qty: 0 | Refills: 0 | COMMUNITY
Start: 2017-03-24

## 2017-03-24 RX ADMIN — Medication 0.12 MILLIGRAM(S): at 05:54

## 2017-03-24 RX ADMIN — Medication 1 DROP(S): at 15:45

## 2017-03-24 RX ADMIN — Medication 1 DROP(S): at 22:41

## 2017-03-24 RX ADMIN — HEPARIN SODIUM 5000 UNIT(S): 5000 INJECTION INTRAVENOUS; SUBCUTANEOUS at 18:10

## 2017-03-24 RX ADMIN — Medication 100 MILLIGRAM(S): at 05:54

## 2017-03-24 RX ADMIN — Medication 30 MILLIGRAM(S): at 15:45

## 2017-03-24 RX ADMIN — Medication 1 DROP(S): at 02:12

## 2017-03-24 RX ADMIN — Medication 1 DROP(S): at 05:54

## 2017-03-24 RX ADMIN — Medication 20 MILLIGRAM(S): at 12:18

## 2017-03-24 RX ADMIN — Medication 125 MICROGRAM(S): at 05:55

## 2017-03-24 RX ADMIN — HEPARIN SODIUM 5000 UNIT(S): 5000 INJECTION INTRAVENOUS; SUBCUTANEOUS at 05:54

## 2017-03-24 RX ADMIN — Medication 100 MILLIGRAM(S): at 22:40

## 2017-03-24 RX ADMIN — Medication 1 DROP(S): at 18:10

## 2017-03-24 RX ADMIN — PANTOPRAZOLE SODIUM 40 MILLIGRAM(S): 20 TABLET, DELAYED RELEASE ORAL at 12:18

## 2017-03-24 RX ADMIN — Medication 1 DROP(S): at 12:17

## 2017-03-24 RX ADMIN — Medication 650 MILLIGRAM(S): at 23:44

## 2017-03-24 RX ADMIN — Medication 100 MILLIGRAM(S): at 15:45

## 2017-03-24 RX ADMIN — Medication 40 MILLIGRAM(S): at 12:16

## 2017-03-24 RX ADMIN — LATANOPROST 1 DROP(S): 0.05 SOLUTION/ DROPS OPHTHALMIC; TOPICAL at 22:40

## 2017-03-24 RX ADMIN — Medication 3 MILLILITER(S): at 05:54

## 2017-03-24 RX ADMIN — Medication 3 MILLILITER(S): at 18:10

## 2017-03-24 RX ADMIN — Medication 3 MILLILITER(S): at 12:17

## 2017-03-24 NOTE — DIETITIAN INITIAL EVALUATION ADULT. - NS AS NUTRI INTERV MEALS SNACK
Pt with PMH of CHF, however, strict diet restriction not warranted at this time given pt's current condition and age. Recommend to continue current Regular diet and add modifiers as needed. RD contact information provided.

## 2017-03-24 NOTE — DIETITIAN INITIAL EVALUATION ADULT. - ENERGY NEEDS
Height: 66 inches, Weight: 167 pounds  BMI: 27 kg/m2 IBW: 142 pounds (+/-10%), %IBW:118%  Pertinent Info: Per chart, 89 y/o male with CHF, HTN, colon resection, laryngeal cancer s/p RT, vocal cord surgery with trach, bladder cancer s/p chemotherapy, admitted with JASON and SOB secondary to influenza, on Tamiflu. No edema, no pressure ulcers noted at this time.

## 2017-03-24 NOTE — DIETITIAN INITIAL EVALUATION ADULT. - OTHER INFO
Nutrition consult received for heart failure. Pt report UBW as 170 pounds and denies any wt fluctuation for 'years'. Noted per previous RD note pt had dosing wt of 186 pounds, current dosing wt is fairly stable from reported UBW at 167 pounds. Pt reports good appetite and po intakes, noted 100% po intakes per flowsheet. No GI distress, +BM yesterday. Pt denies chewing/swallowing difficulties. NKFA. PTA takes vitamin D and vitamin E. Pt with PMH of CHF per H&P, states he was never advise to limit salt intakes, check daily wts or has been on diuretics.

## 2017-03-24 NOTE — DIETITIAN INITIAL EVALUATION ADULT. - ADHERENCE
Pt denies following any modified diet PTA, states he was never advised to avoid using salt and states 'I'm in great shape and I've been doing something right at my age'

## 2017-03-24 NOTE — DIETITIAN INITIAL EVALUATION ADULT. - PROBLEM SELECTOR PLAN 6
--patient confirms DNR, wants time to think about DNI and discuss with daughter  --patient is AOx3, has capacity to make this decision  --30 minutes advanced care planning spent

## 2017-03-24 NOTE — DIETITIAN INITIAL EVALUATION ADULT. - ORAL INTAKE PTA
Pt states he has a aid that helps out with meal preparation at home, reports good po intakes PTA/good

## 2017-03-25 LAB
ANION GAP SERPL CALC-SCNC: 16 MMOL/L — SIGNIFICANT CHANGE UP (ref 5–17)
ANISOCYTOSIS BLD QL: SLIGHT — SIGNIFICANT CHANGE UP
BASOPHILS # BLD AUTO: 0.01 K/UL — SIGNIFICANT CHANGE UP (ref 0–0.2)
BASOPHILS NFR BLD AUTO: 0.3 % — SIGNIFICANT CHANGE UP (ref 0–2)
BUN SERPL-MCNC: 30 MG/DL — HIGH (ref 7–23)
BURR CELLS BLD QL SMEAR: SLIGHT — SIGNIFICANT CHANGE UP
CALCIUM SERPL-MCNC: 8.8 MG/DL — SIGNIFICANT CHANGE UP (ref 8.4–10.5)
CHLORIDE SERPL-SCNC: 107 MMOL/L — SIGNIFICANT CHANGE UP (ref 96–108)
CO2 SERPL-SCNC: 21 MMOL/L — LOW (ref 22–31)
CREAT SERPL-MCNC: 1.55 MG/DL — HIGH (ref 0.5–1.3)
EOSINOPHIL # BLD AUTO: 0 K/UL — SIGNIFICANT CHANGE UP (ref 0–0.5)
EOSINOPHIL NFR BLD AUTO: 0 % — SIGNIFICANT CHANGE UP (ref 0–6)
GLUCOSE SERPL-MCNC: 102 MG/DL — HIGH (ref 70–99)
HCT VFR BLD CALC: 38.2 % — LOW (ref 39–50)
HGB BLD-MCNC: 12.4 G/DL — LOW (ref 13–17)
IMM GRANULOCYTES NFR BLD AUTO: 0.3 % — SIGNIFICANT CHANGE UP (ref 0–1.5)
LYMPHOCYTES # BLD AUTO: 0.92 K/UL — LOW (ref 1–3.3)
LYMPHOCYTES # BLD AUTO: 29.1 % — SIGNIFICANT CHANGE UP (ref 13–44)
MANUAL SMEAR VERIFICATION: SIGNIFICANT CHANGE UP
MCHC RBC-ENTMCNC: 26.8 PG — LOW (ref 27–34)
MCHC RBC-ENTMCNC: 32.5 GM/DL — SIGNIFICANT CHANGE UP (ref 32–36)
MCV RBC AUTO: 82.5 FL — SIGNIFICANT CHANGE UP (ref 80–100)
MONOCYTES # BLD AUTO: 0.61 K/UL — SIGNIFICANT CHANGE UP (ref 0–0.9)
MONOCYTES NFR BLD AUTO: 19.3 % — HIGH (ref 2–14)
NEUTROPHILS # BLD AUTO: 1.61 K/UL — LOW (ref 1.8–7.4)
NEUTROPHILS NFR BLD AUTO: 51 % — SIGNIFICANT CHANGE UP (ref 43–77)
PLAT MORPH BLD: NORMAL — SIGNIFICANT CHANGE UP
PLATELET # BLD AUTO: 114 K/UL — LOW (ref 150–400)
POIKILOCYTOSIS BLD QL AUTO: SLIGHT — SIGNIFICANT CHANGE UP
POTASSIUM SERPL-MCNC: 4.4 MMOL/L — SIGNIFICANT CHANGE UP (ref 3.5–5.3)
POTASSIUM SERPL-SCNC: 4.4 MMOL/L — SIGNIFICANT CHANGE UP (ref 3.5–5.3)
RBC # BLD: 4.63 M/UL — SIGNIFICANT CHANGE UP (ref 4.2–5.8)
RBC # FLD: 20 % — HIGH (ref 10.3–14.5)
RBC BLD AUTO: ABNORMAL
SODIUM SERPL-SCNC: 144 MMOL/L — SIGNIFICANT CHANGE UP (ref 135–145)
WBC # BLD: 3.16 K/UL — LOW (ref 3.8–10.5)
WBC # FLD AUTO: 3.16 K/UL — LOW (ref 3.8–10.5)

## 2017-03-25 RX ADMIN — Medication 40 MILLIGRAM(S): at 06:56

## 2017-03-25 RX ADMIN — PANTOPRAZOLE SODIUM 40 MILLIGRAM(S): 20 TABLET, DELAYED RELEASE ORAL at 13:11

## 2017-03-25 RX ADMIN — Medication 1 DROP(S): at 21:55

## 2017-03-25 RX ADMIN — Medication 100 MILLIGRAM(S): at 21:55

## 2017-03-25 RX ADMIN — Medication 0.12 MILLIGRAM(S): at 06:50

## 2017-03-25 RX ADMIN — Medication 3 MILLILITER(S): at 06:50

## 2017-03-25 RX ADMIN — Medication 20 MILLIGRAM(S): at 13:11

## 2017-03-25 RX ADMIN — Medication 1 DROP(S): at 15:34

## 2017-03-25 RX ADMIN — Medication 125 MICROGRAM(S): at 06:51

## 2017-03-25 RX ADMIN — Medication 3 MILLILITER(S): at 18:24

## 2017-03-25 RX ADMIN — Medication 100 MILLIGRAM(S): at 13:08

## 2017-03-25 RX ADMIN — HEPARIN SODIUM 5000 UNIT(S): 5000 INJECTION INTRAVENOUS; SUBCUTANEOUS at 06:51

## 2017-03-25 RX ADMIN — Medication 1 DROP(S): at 13:10

## 2017-03-25 RX ADMIN — Medication 1 DROP(S): at 09:04

## 2017-03-25 RX ADMIN — Medication 30 MILLIGRAM(S): at 13:10

## 2017-03-25 RX ADMIN — Medication 1 DROP(S): at 18:24

## 2017-03-25 RX ADMIN — Medication 650 MILLIGRAM(S): at 01:21

## 2017-03-25 RX ADMIN — Medication 3 MILLILITER(S): at 00:27

## 2017-03-25 RX ADMIN — LATANOPROST 1 DROP(S): 0.05 SOLUTION/ DROPS OPHTHALMIC; TOPICAL at 21:55

## 2017-03-25 RX ADMIN — Medication 100 MILLIGRAM(S): at 06:51

## 2017-03-25 RX ADMIN — HEPARIN SODIUM 5000 UNIT(S): 5000 INJECTION INTRAVENOUS; SUBCUTANEOUS at 18:23

## 2017-03-25 RX ADMIN — Medication 3 MILLILITER(S): at 13:07

## 2017-03-25 RX ADMIN — Medication 1 DROP(S): at 06:50

## 2017-03-26 LAB
ANION GAP SERPL CALC-SCNC: 13 MMOL/L — SIGNIFICANT CHANGE UP (ref 5–17)
BASOPHILS # BLD AUTO: 0.01 K/UL — SIGNIFICANT CHANGE UP (ref 0–0.2)
BASOPHILS NFR BLD AUTO: 0.3 % — SIGNIFICANT CHANGE UP (ref 0–2)
BUN SERPL-MCNC: 32 MG/DL — HIGH (ref 7–23)
CALCIUM SERPL-MCNC: 9.1 MG/DL — SIGNIFICANT CHANGE UP (ref 8.4–10.5)
CHLORIDE SERPL-SCNC: 103 MMOL/L — SIGNIFICANT CHANGE UP (ref 96–108)
CO2 SERPL-SCNC: 23 MMOL/L — SIGNIFICANT CHANGE UP (ref 22–31)
CREAT SERPL-MCNC: 1.55 MG/DL — HIGH (ref 0.5–1.3)
EOSINOPHIL # BLD AUTO: 0.01 K/UL — SIGNIFICANT CHANGE UP (ref 0–0.5)
EOSINOPHIL NFR BLD AUTO: 0.3 % — SIGNIFICANT CHANGE UP (ref 0–6)
GLUCOSE SERPL-MCNC: 88 MG/DL — SIGNIFICANT CHANGE UP (ref 70–99)
HCT VFR BLD CALC: 38.5 % — LOW (ref 39–50)
HGB BLD-MCNC: 12.3 G/DL — LOW (ref 13–17)
IMM GRANULOCYTES NFR BLD AUTO: 0.3 % — SIGNIFICANT CHANGE UP (ref 0–1.5)
LYMPHOCYTES # BLD AUTO: 1.09 K/UL — SIGNIFICANT CHANGE UP (ref 1–3.3)
LYMPHOCYTES # BLD AUTO: 30.9 % — SIGNIFICANT CHANGE UP (ref 13–44)
MCHC RBC-ENTMCNC: 26.4 PG — LOW (ref 27–34)
MCHC RBC-ENTMCNC: 31.9 GM/DL — LOW (ref 32–36)
MCV RBC AUTO: 82.6 FL — SIGNIFICANT CHANGE UP (ref 80–100)
MONOCYTES # BLD AUTO: 0.45 K/UL — SIGNIFICANT CHANGE UP (ref 0–0.9)
MONOCYTES NFR BLD AUTO: 12.7 % — SIGNIFICANT CHANGE UP (ref 2–14)
NEUTROPHILS # BLD AUTO: 1.96 K/UL — SIGNIFICANT CHANGE UP (ref 1.8–7.4)
NEUTROPHILS NFR BLD AUTO: 55.5 % — SIGNIFICANT CHANGE UP (ref 43–77)
PLATELET # BLD AUTO: 115 K/UL — LOW (ref 150–400)
POTASSIUM SERPL-MCNC: 4 MMOL/L — SIGNIFICANT CHANGE UP (ref 3.5–5.3)
POTASSIUM SERPL-SCNC: 4 MMOL/L — SIGNIFICANT CHANGE UP (ref 3.5–5.3)
RBC # BLD: 4.66 M/UL — SIGNIFICANT CHANGE UP (ref 4.2–5.8)
RBC # FLD: 19.9 % — HIGH (ref 10.3–14.5)
SODIUM SERPL-SCNC: 139 MMOL/L — SIGNIFICANT CHANGE UP (ref 135–145)
WBC # BLD: 3.53 K/UL — LOW (ref 3.8–10.5)
WBC # FLD AUTO: 3.53 K/UL — LOW (ref 3.8–10.5)

## 2017-03-26 RX ADMIN — Medication 1 DROP(S): at 15:15

## 2017-03-26 RX ADMIN — Medication 100 MILLIGRAM(S): at 06:46

## 2017-03-26 RX ADMIN — Medication 1 DROP(S): at 17:55

## 2017-03-26 RX ADMIN — Medication 20 MILLIGRAM(S): at 13:01

## 2017-03-26 RX ADMIN — HEPARIN SODIUM 5000 UNIT(S): 5000 INJECTION INTRAVENOUS; SUBCUTANEOUS at 06:46

## 2017-03-26 RX ADMIN — PANTOPRAZOLE SODIUM 40 MILLIGRAM(S): 20 TABLET, DELAYED RELEASE ORAL at 13:00

## 2017-03-26 RX ADMIN — Medication 0.12 MILLIGRAM(S): at 06:45

## 2017-03-26 RX ADMIN — Medication 3 MILLILITER(S): at 00:50

## 2017-03-26 RX ADMIN — Medication 100 MILLIGRAM(S): at 15:14

## 2017-03-26 RX ADMIN — Medication 1 DROP(S): at 22:48

## 2017-03-26 RX ADMIN — Medication 3 MILLILITER(S): at 17:55

## 2017-03-26 RX ADMIN — Medication 3 MILLILITER(S): at 06:45

## 2017-03-26 RX ADMIN — Medication 125 MICROGRAM(S): at 06:45

## 2017-03-26 RX ADMIN — Medication 30 MILLIGRAM(S): at 13:00

## 2017-03-26 RX ADMIN — Medication 40 MILLIGRAM(S): at 06:46

## 2017-03-26 RX ADMIN — LATANOPROST 1 DROP(S): 0.05 SOLUTION/ DROPS OPHTHALMIC; TOPICAL at 22:54

## 2017-03-26 RX ADMIN — Medication 3 MILLILITER(S): at 12:59

## 2017-03-26 RX ADMIN — Medication 1 DROP(S): at 06:45

## 2017-03-26 RX ADMIN — Medication 1 DROP(S): at 12:59

## 2017-03-26 RX ADMIN — HEPARIN SODIUM 5000 UNIT(S): 5000 INJECTION INTRAVENOUS; SUBCUTANEOUS at 17:55

## 2017-03-26 RX ADMIN — Medication 100 MILLIGRAM(S): at 22:54

## 2017-03-26 RX ADMIN — Medication 1 DROP(S): at 00:51

## 2017-03-26 RX ADMIN — Medication 1 DROP(S): at 08:48

## 2017-03-26 NOTE — PHYSICAL THERAPY INITIAL EVALUATION ADULT - ADDITIONAL COMMENTS
Lives in a pvt house with his girl friend on 1st floor, 14 steps to go up HR present, Independent in ADLs PTA.

## 2017-03-26 NOTE — PHYSICAL THERAPY INITIAL EVALUATION ADULT - PERTINENT HX OF CURRENT PROBLEM, REHAB EVAL
89 yr M with hypothyroidism, HTN, CHF, colon resection, laryngeal Ca s/p XRT, vocal cord surgery and trach and bladder ca s/p chemotherapy, pulmonary embolism 13 years ago treated with AC for one year presenting with shortness of breath x 24 hours, found to be influenza positive, Results shows decreased h/h, + bacteria in urine culture, CXR: Subsegmental linear atelectasis of the right middle lobe.

## 2017-03-27 LAB
ANION GAP SERPL CALC-SCNC: 10 MMOL/L — SIGNIFICANT CHANGE UP (ref 5–17)
BUN SERPL-MCNC: 32 MG/DL — HIGH (ref 7–23)
CALCIUM SERPL-MCNC: 8.8 MG/DL — SIGNIFICANT CHANGE UP (ref 8.4–10.5)
CHLORIDE SERPL-SCNC: 104 MMOL/L — SIGNIFICANT CHANGE UP (ref 96–108)
CO2 SERPL-SCNC: 24 MMOL/L — SIGNIFICANT CHANGE UP (ref 22–31)
CREAT SERPL-MCNC: 1.55 MG/DL — HIGH (ref 0.5–1.3)
GLUCOSE SERPL-MCNC: 93 MG/DL — SIGNIFICANT CHANGE UP (ref 70–99)
HCT VFR BLD CALC: 38.6 % — LOW (ref 39–50)
HGB BLD-MCNC: 12.4 G/DL — LOW (ref 13–17)
MCHC RBC-ENTMCNC: 26.6 PG — LOW (ref 27–34)
MCHC RBC-ENTMCNC: 32.1 GM/DL — SIGNIFICANT CHANGE UP (ref 32–36)
MCV RBC AUTO: 82.7 FL — SIGNIFICANT CHANGE UP (ref 80–100)
PLATELET # BLD AUTO: 109 K/UL — LOW (ref 150–400)
POTASSIUM SERPL-MCNC: 3.9 MMOL/L — SIGNIFICANT CHANGE UP (ref 3.5–5.3)
POTASSIUM SERPL-SCNC: 3.9 MMOL/L — SIGNIFICANT CHANGE UP (ref 3.5–5.3)
RBC # BLD: 4.67 M/UL — SIGNIFICANT CHANGE UP (ref 4.2–5.8)
RBC # FLD: 19.6 % — HIGH (ref 10.3–14.5)
SODIUM SERPL-SCNC: 138 MMOL/L — SIGNIFICANT CHANGE UP (ref 135–145)
WBC # BLD: 3.23 K/UL — LOW (ref 3.8–10.5)
WBC # FLD AUTO: 3.23 K/UL — LOW (ref 3.8–10.5)

## 2017-03-27 RX ORDER — IPRATROPIUM/ALBUTEROL SULFATE 18-103MCG
3 AEROSOL WITH ADAPTER (GRAM) INHALATION
Qty: 0 | Refills: 0 | COMMUNITY
Start: 2017-03-27

## 2017-03-27 RX ADMIN — Medication 1 DROP(S): at 21:22

## 2017-03-27 RX ADMIN — Medication 3 MILLILITER(S): at 12:37

## 2017-03-27 RX ADMIN — Medication 40 MILLIGRAM(S): at 06:59

## 2017-03-27 RX ADMIN — Medication 3 MILLILITER(S): at 17:26

## 2017-03-27 RX ADMIN — Medication 1 DROP(S): at 07:00

## 2017-03-27 RX ADMIN — Medication 3 MILLILITER(S): at 00:54

## 2017-03-27 RX ADMIN — Medication 3 MILLILITER(S): at 06:59

## 2017-03-27 RX ADMIN — Medication 20 MILLIGRAM(S): at 12:37

## 2017-03-27 RX ADMIN — HEPARIN SODIUM 5000 UNIT(S): 5000 INJECTION INTRAVENOUS; SUBCUTANEOUS at 16:32

## 2017-03-27 RX ADMIN — PANTOPRAZOLE SODIUM 40 MILLIGRAM(S): 20 TABLET, DELAYED RELEASE ORAL at 12:37

## 2017-03-27 RX ADMIN — Medication 1 DROP(S): at 16:32

## 2017-03-27 RX ADMIN — Medication 1 DROP(S): at 12:37

## 2017-03-27 RX ADMIN — Medication 100 MILLIGRAM(S): at 07:00

## 2017-03-27 RX ADMIN — Medication 100 MILLIGRAM(S): at 21:22

## 2017-03-27 RX ADMIN — HEPARIN SODIUM 5000 UNIT(S): 5000 INJECTION INTRAVENOUS; SUBCUTANEOUS at 06:59

## 2017-03-27 RX ADMIN — Medication 125 MICROGRAM(S): at 06:59

## 2017-03-27 RX ADMIN — Medication 1 DROP(S): at 08:54

## 2017-03-27 RX ADMIN — Medication 30 MILLIGRAM(S): at 12:37

## 2017-03-27 RX ADMIN — LATANOPROST 1 DROP(S): 0.05 SOLUTION/ DROPS OPHTHALMIC; TOPICAL at 21:22

## 2017-03-27 RX ADMIN — Medication 100 MILLIGRAM(S): at 12:37

## 2017-03-27 RX ADMIN — Medication 0.12 MILLIGRAM(S): at 06:59

## 2017-03-28 VITALS
HEART RATE: 73 BPM | SYSTOLIC BLOOD PRESSURE: 124 MMHG | TEMPERATURE: 98 F | DIASTOLIC BLOOD PRESSURE: 75 MMHG | OXYGEN SATURATION: 93 % | RESPIRATION RATE: 17 BRPM

## 2017-03-28 LAB
ANION GAP SERPL CALC-SCNC: 11 MMOL/L — SIGNIFICANT CHANGE UP (ref 5–17)
BUN SERPL-MCNC: 31 MG/DL — HIGH (ref 7–23)
CALCIUM SERPL-MCNC: 8.9 MG/DL — SIGNIFICANT CHANGE UP (ref 8.4–10.5)
CHLORIDE SERPL-SCNC: 102 MMOL/L — SIGNIFICANT CHANGE UP (ref 96–108)
CO2 SERPL-SCNC: 29 MMOL/L — SIGNIFICANT CHANGE UP (ref 22–31)
CREAT SERPL-MCNC: 1.74 MG/DL — HIGH (ref 0.5–1.3)
GLUCOSE SERPL-MCNC: 97 MG/DL — SIGNIFICANT CHANGE UP (ref 70–99)
POTASSIUM SERPL-MCNC: 3.9 MMOL/L — SIGNIFICANT CHANGE UP (ref 3.5–5.3)
POTASSIUM SERPL-SCNC: 3.9 MMOL/L — SIGNIFICANT CHANGE UP (ref 3.5–5.3)
SODIUM SERPL-SCNC: 142 MMOL/L — SIGNIFICANT CHANGE UP (ref 135–145)

## 2017-03-28 PROCEDURE — 84295 ASSAY OF SERUM SODIUM: CPT

## 2017-03-28 PROCEDURE — 85610 PROTHROMBIN TIME: CPT

## 2017-03-28 PROCEDURE — 85027 COMPLETE CBC AUTOMATED: CPT

## 2017-03-28 PROCEDURE — 84132 ASSAY OF SERUM POTASSIUM: CPT

## 2017-03-28 PROCEDURE — 80048 BASIC METABOLIC PNL TOTAL CA: CPT

## 2017-03-28 PROCEDURE — 93005 ELECTROCARDIOGRAM TRACING: CPT

## 2017-03-28 PROCEDURE — 87486 CHLMYD PNEUM DNA AMP PROBE: CPT

## 2017-03-28 PROCEDURE — 87040 BLOOD CULTURE FOR BACTERIA: CPT

## 2017-03-28 PROCEDURE — 87086 URINE CULTURE/COLONY COUNT: CPT

## 2017-03-28 PROCEDURE — 82803 BLOOD GASES ANY COMBINATION: CPT

## 2017-03-28 PROCEDURE — 85014 HEMATOCRIT: CPT

## 2017-03-28 PROCEDURE — 96374 THER/PROPH/DIAG INJ IV PUSH: CPT

## 2017-03-28 PROCEDURE — 82435 ASSAY OF BLOOD CHLORIDE: CPT

## 2017-03-28 PROCEDURE — 87581 M.PNEUMON DNA AMP PROBE: CPT

## 2017-03-28 PROCEDURE — 99285 EMERGENCY DEPT VISIT HI MDM: CPT | Mod: 25

## 2017-03-28 PROCEDURE — 87633 RESP VIRUS 12-25 TARGETS: CPT

## 2017-03-28 PROCEDURE — 85730 THROMBOPLASTIN TIME PARTIAL: CPT

## 2017-03-28 PROCEDURE — 80053 COMPREHEN METABOLIC PANEL: CPT

## 2017-03-28 PROCEDURE — 83605 ASSAY OF LACTIC ACID: CPT

## 2017-03-28 PROCEDURE — 82330 ASSAY OF CALCIUM: CPT

## 2017-03-28 PROCEDURE — 81001 URINALYSIS AUTO W/SCOPE: CPT

## 2017-03-28 PROCEDURE — 82947 ASSAY GLUCOSE BLOOD QUANT: CPT

## 2017-03-28 PROCEDURE — 87798 DETECT AGENT NOS DNA AMP: CPT

## 2017-03-28 PROCEDURE — 71046 X-RAY EXAM CHEST 2 VIEWS: CPT

## 2017-03-28 PROCEDURE — 97162 PT EVAL MOD COMPLEX 30 MIN: CPT

## 2017-03-28 PROCEDURE — 94640 AIRWAY INHALATION TREATMENT: CPT

## 2017-03-28 RX ORDER — LEVOTHYROXINE SODIUM 125 MCG
1 TABLET ORAL
Qty: 0 | Refills: 0 | COMMUNITY

## 2017-03-28 RX ORDER — DIGOXIN 250 MCG
2 TABLET ORAL
Qty: 0 | Refills: 0 | COMMUNITY

## 2017-03-28 RX ORDER — IPRATROPIUM/ALBUTEROL SULFATE 18-103MCG
3 AEROSOL WITH ADAPTER (GRAM) INHALATION
Qty: 1 | Refills: 0 | OUTPATIENT
Start: 2017-03-28 | End: 2017-04-27

## 2017-03-28 RX ADMIN — Medication 1 DROP(S): at 08:51

## 2017-03-28 RX ADMIN — Medication 0.12 MILLIGRAM(S): at 06:36

## 2017-03-28 RX ADMIN — Medication 1 DROP(S): at 00:11

## 2017-03-28 RX ADMIN — Medication 40 MILLIGRAM(S): at 06:36

## 2017-03-28 RX ADMIN — PANTOPRAZOLE SODIUM 40 MILLIGRAM(S): 20 TABLET, DELAYED RELEASE ORAL at 12:53

## 2017-03-28 RX ADMIN — Medication 125 MICROGRAM(S): at 06:37

## 2017-03-28 RX ADMIN — Medication 3 MILLILITER(S): at 06:36

## 2017-03-28 RX ADMIN — Medication 1 DROP(S): at 06:36

## 2017-03-28 RX ADMIN — HEPARIN SODIUM 5000 UNIT(S): 5000 INJECTION INTRAVENOUS; SUBCUTANEOUS at 06:37

## 2017-03-28 RX ADMIN — Medication 1 DROP(S): at 12:54

## 2017-03-28 RX ADMIN — Medication 100 MILLIGRAM(S): at 06:37

## 2017-03-28 RX ADMIN — Medication 3 MILLILITER(S): at 00:11

## 2017-03-28 RX ADMIN — Medication 20 MILLIGRAM(S): at 12:54

## 2017-03-28 RX ADMIN — Medication 3 MILLILITER(S): at 12:53

## 2017-04-04 ENCOUNTER — EMERGENCY (EMERGENCY)
Facility: HOSPITAL | Age: 82
LOS: 1 days | Discharge: HOME | End: 2017-04-04
Attending: EMERGENCY MEDICINE | Admitting: EMERGENCY MEDICINE
Payer: MEDICARE

## 2017-04-04 VITALS
RESPIRATION RATE: 16 BRPM | SYSTOLIC BLOOD PRESSURE: 170 MMHG | DIASTOLIC BLOOD PRESSURE: 90 MMHG | HEART RATE: 86 BPM | OXYGEN SATURATION: 100 %

## 2017-04-04 VITALS
HEART RATE: 78 BPM | SYSTOLIC BLOOD PRESSURE: 150 MMHG | OXYGEN SATURATION: 97 % | RESPIRATION RATE: 16 BRPM | DIASTOLIC BLOOD PRESSURE: 89 MMHG | TEMPERATURE: 98 F

## 2017-04-04 DIAGNOSIS — R33.9 RETENTION OF URINE, UNSPECIFIED: ICD-10-CM

## 2017-04-04 DIAGNOSIS — Z98.890 OTHER SPECIFIED POSTPROCEDURAL STATES: ICD-10-CM

## 2017-04-04 DIAGNOSIS — R30.0 DYSURIA: ICD-10-CM

## 2017-04-04 DIAGNOSIS — I50.9 HEART FAILURE, UNSPECIFIED: ICD-10-CM

## 2017-04-04 DIAGNOSIS — K59.00 CONSTIPATION, UNSPECIFIED: ICD-10-CM

## 2017-04-04 DIAGNOSIS — I11.0 HYPERTENSIVE HEART DISEASE WITH HEART FAILURE: ICD-10-CM

## 2017-04-04 DIAGNOSIS — E78.5 HYPERLIPIDEMIA, UNSPECIFIED: ICD-10-CM

## 2017-04-04 LAB
ALBUMIN SERPL ELPH-MCNC: 3.3 G/DL — SIGNIFICANT CHANGE UP (ref 3.3–5)
ALP SERPL-CCNC: 153 U/L — HIGH (ref 40–120)
ALT FLD-CCNC: 19 U/L RC — SIGNIFICANT CHANGE UP (ref 10–45)
ANION GAP SERPL CALC-SCNC: 11 MMOL/L — SIGNIFICANT CHANGE UP (ref 5–17)
APPEARANCE UR: CLEAR — SIGNIFICANT CHANGE UP
AST SERPL-CCNC: 17 U/L — SIGNIFICANT CHANGE UP (ref 10–40)
BASOPHILS # BLD AUTO: 0 K/UL — SIGNIFICANT CHANGE UP (ref 0–0.2)
BILIRUB SERPL-MCNC: 0.5 MG/DL — SIGNIFICANT CHANGE UP (ref 0.2–1.2)
BILIRUB UR-MCNC: NEGATIVE — SIGNIFICANT CHANGE UP
BUN SERPL-MCNC: 30 MG/DL — HIGH (ref 7–23)
CALCIUM SERPL-MCNC: 8.9 MG/DL — SIGNIFICANT CHANGE UP (ref 8.4–10.5)
CHLORIDE SERPL-SCNC: 103 MMOL/L — SIGNIFICANT CHANGE UP (ref 96–108)
CO2 SERPL-SCNC: 21 MMOL/L — LOW (ref 22–31)
COLOR SPEC: SIGNIFICANT CHANGE UP
CREAT SERPL-MCNC: 1.86 MG/DL — HIGH (ref 0.5–1.3)
DIFF PNL FLD: ABNORMAL
EOSINOPHIL # BLD AUTO: 0.1 K/UL — SIGNIFICANT CHANGE UP (ref 0–0.5)
EOSINOPHIL NFR BLD AUTO: 2 % — SIGNIFICANT CHANGE UP (ref 0–6)
GLUCOSE SERPL-MCNC: 106 MG/DL — HIGH (ref 70–99)
GLUCOSE UR QL: NEGATIVE — SIGNIFICANT CHANGE UP
HCT VFR BLD CALC: 41.2 % — SIGNIFICANT CHANGE UP (ref 39–50)
HGB BLD-MCNC: 13.5 G/DL — SIGNIFICANT CHANGE UP (ref 13–17)
KETONES UR-MCNC: NEGATIVE — SIGNIFICANT CHANGE UP
LEUKOCYTE ESTERASE UR-ACNC: NEGATIVE — SIGNIFICANT CHANGE UP
LYMPHOCYTES # BLD AUTO: 1.4 K/UL — SIGNIFICANT CHANGE UP (ref 1–3.3)
LYMPHOCYTES # BLD AUTO: 26 % — SIGNIFICANT CHANGE UP (ref 13–44)
MCHC RBC-ENTMCNC: 27.5 PG — SIGNIFICANT CHANGE UP (ref 27–34)
MCHC RBC-ENTMCNC: 32.6 GM/DL — SIGNIFICANT CHANGE UP (ref 32–36)
MCV RBC AUTO: 84.2 FL — SIGNIFICANT CHANGE UP (ref 80–100)
MONOCYTES # BLD AUTO: 1.1 K/UL — HIGH (ref 0–0.9)
MONOCYTES NFR BLD AUTO: 21 % — HIGH (ref 2–14)
NEUTROPHILS # BLD AUTO: 2.7 K/UL — SIGNIFICANT CHANGE UP (ref 1.8–7.4)
NEUTROPHILS NFR BLD AUTO: 51 % — SIGNIFICANT CHANGE UP (ref 43–77)
NITRITE UR-MCNC: NEGATIVE — SIGNIFICANT CHANGE UP
PH UR: 6.5 — SIGNIFICANT CHANGE UP (ref 4.8–8)
PLATELET # BLD AUTO: 183 K/UL — SIGNIFICANT CHANGE UP (ref 150–400)
POTASSIUM SERPL-MCNC: 5.2 MMOL/L — SIGNIFICANT CHANGE UP (ref 3.5–5.3)
POTASSIUM SERPL-SCNC: 5.2 MMOL/L — SIGNIFICANT CHANGE UP (ref 3.5–5.3)
PROT SERPL-MCNC: 6.3 G/DL — SIGNIFICANT CHANGE UP (ref 6–8.3)
PROT UR-MCNC: SIGNIFICANT CHANGE UP
RBC # BLD: 4.9 M/UL — SIGNIFICANT CHANGE UP (ref 4.2–5.8)
RBC # FLD: 18 % — HIGH (ref 10.3–14.5)
RBC CASTS # UR COMP ASSIST: SIGNIFICANT CHANGE UP /HPF (ref 0–2)
SODIUM SERPL-SCNC: 135 MMOL/L — SIGNIFICANT CHANGE UP (ref 135–145)
SP GR SPEC: 1 — LOW (ref 1.01–1.02)
UROBILINOGEN FLD QL: NEGATIVE — SIGNIFICANT CHANGE UP
WBC # BLD: 5.3 K/UL — SIGNIFICANT CHANGE UP (ref 3.8–10.5)
WBC # FLD AUTO: 5.3 K/UL — SIGNIFICANT CHANGE UP (ref 3.8–10.5)
WBC UR QL: SIGNIFICANT CHANGE UP /HPF (ref 0–5)

## 2017-04-04 PROCEDURE — 80053 COMPREHEN METABOLIC PANEL: CPT

## 2017-04-04 PROCEDURE — 51702 INSERT TEMP BLADDER CATH: CPT

## 2017-04-04 PROCEDURE — 99284 EMERGENCY DEPT VISIT MOD MDM: CPT | Mod: GC

## 2017-04-04 PROCEDURE — 87086 URINE CULTURE/COLONY COUNT: CPT

## 2017-04-04 PROCEDURE — 99284 EMERGENCY DEPT VISIT MOD MDM: CPT | Mod: 25

## 2017-04-04 PROCEDURE — 85027 COMPLETE CBC AUTOMATED: CPT

## 2017-04-04 PROCEDURE — 81001 URINALYSIS AUTO W/SCOPE: CPT

## 2017-04-04 RX ORDER — DOCUSATE SODIUM 100 MG
100 CAPSULE ORAL DAILY
Qty: 0 | Refills: 0 | Status: DISCONTINUED | OUTPATIENT
Start: 2017-04-04 | End: 2017-04-08

## 2017-04-04 RX ADMIN — Medication 100 MILLIGRAM(S): at 19:31

## 2017-04-04 NOTE — ED PROVIDER NOTE - OBJECTIVE STATEMENT
89yoM pmh hypothyroidism, HTN, CHF, colon resection, laryngeal Ca s/p XRT, vocal cord surgery and trach and bladder ca s/p chemotherapy, pulmonary embolism 13 years ago treated with AC, ?bladder ca (urologist  -[facility]) comes to ED c/o 1 day hx of urinary retnation and +dysuria.  Pt also reports 1-2 days of constipation with last bm today.  pt recently admitted for +flu.  In Ed pt NAD reports no f.c, no cp/sob/palp, no n/v/d,     lives at home with girlfriend and hha.

## 2017-04-04 NOTE — ED ADULT NURSE REASSESSMENT NOTE - NS ED NURSE REASSESS COMMENT FT1
Pt bladder scanned- approx. 600 cc urine. MD aware. urology paged to insert ling d/t hx of bladder Ca.

## 2017-04-04 NOTE — ED ADULT NURSE REASSESSMENT NOTE - NS ED NURSE REASSESS COMMENT FT1
Wife at bedside. States desire for patient to be observed overnight. Silvia MCRAE spoke with patient. Patient discharged home. Taught patient and wife about urinary catheter care and leg bag care. Demonstrated how to empty leg bag. Instructed to follow up with urology. VSS Olivarez catheter placed by Urology earlier. Clear yellow urine drained. At 23:30, Wife at bedside. States desire for patient to be observed overnight. Silvia MCRAE spoke with patient. Patient discharged home. Taught patient and wife about urinary catheter care and leg bag care. Demonstrated how to empty leg bag. Instructed to follow up with urology. VSS

## 2017-04-04 NOTE — ED ADULT NURSE NOTE - ED STAT RN HANDOFF DETAILS
Report received from Minerva Nieves RN. Urology at bedside placing ling catheter. Colace administered for constipation. No acute distress noted. Will continue to monitor.

## 2017-04-04 NOTE — ED ADULT NURSE NOTE - OBJECTIVE STATEMENT
89 y.o male arrived by ambulance c c/o urinary retention. Pt expelling small amounts of urine when attempting to urinate. denies pain/ hematuria. Last BM yesterday. Pt has trach- RA. Afebrile. No chills. Denies CP. Denies NVD. family at bedside.

## 2017-04-04 NOTE — ED PROVIDER NOTE - MEDICAL DECISION MAKING DETAILS
Silvia: 89 yo M pmhx hypothyroidism, HTN, CHF, colon resection, laryngeal Ca s/p XRT, vocal cord surgery and trach and bladder ca s/p chemotherapy, pulmonary embolism 13 years ago treated with AC, [facility] c/o 1 day hx of urinary retention and +dysuria.  Pt also reports 1-2 days of constipation with last bm today.  pt recently admitted for +flu.  In Ed pt NAD reports no f.c, no cp/sob/palp, no n/v/d, Silvia: 89 yo M pmhx hypothyroidism, HTN, CHF, colon resection, laryngeal Ca s/p XRT, vocal cord surgery and trach and bladder ca s/p chemotherapy, pulmonary embolism 13 years ago treated with AC, bladder ca (urologist  -[place]) p/w 1 day hx of urinary retention and +dysuria. No f/c.   -urology called for ling placement with outpt of 1100 cc; will send ua, u/c and reassess to be discharged with leg bag to follow with urology

## 2017-04-05 LAB
CULTURE RESULTS: NO GROWTH — SIGNIFICANT CHANGE UP
SPECIMEN SOURCE: SIGNIFICANT CHANGE UP

## 2017-05-22 ENCOUNTER — OUTPATIENT (OUTPATIENT)
Dept: OUTPATIENT SERVICES | Facility: HOSPITAL | Age: 82
LOS: 1 days | Discharge: HOME | End: 2017-05-22

## 2017-05-22 ENCOUNTER — RESULT REVIEW (OUTPATIENT)
Age: 82
End: 2017-05-22

## 2017-05-22 ENCOUNTER — APPOINTMENT (OUTPATIENT)
Dept: HEMATOLOGY ONCOLOGY | Facility: CLINIC | Age: 82
End: 2017-05-22

## 2017-05-22 VITALS
WEIGHT: 165.35 LBS | DIASTOLIC BLOOD PRESSURE: 60 MMHG | SYSTOLIC BLOOD PRESSURE: 132 MMHG | HEIGHT: 66.14 IN | OXYGEN SATURATION: 97 % | HEART RATE: 83 BPM | RESPIRATION RATE: 16 BRPM | BODY MASS INDEX: 26.57 KG/M2 | TEMPERATURE: 97.8 F

## 2017-05-22 DIAGNOSIS — C95.90 LEUKEMIA, UNSPECIFIED NOT HAVING ACHIEVED REMISSION: ICD-10-CM

## 2017-05-22 LAB
EOSINOPHIL # BLD AUTO: 0.1 K/UL — SIGNIFICANT CHANGE UP (ref 0–0.5)
EOSINOPHIL NFR BLD AUTO: 1 % — SIGNIFICANT CHANGE UP (ref 0–6)
HCT VFR BLD CALC: 34.3 % — LOW (ref 39–50)
HGB BLD-MCNC: 12 G/DL — LOW (ref 13–17)
LYMPHOCYTES # BLD AUTO: 0.8 K/UL — LOW (ref 1–3.3)
LYMPHOCYTES # BLD AUTO: 40 % — SIGNIFICANT CHANGE UP (ref 13–44)
MCHC RBC-ENTMCNC: 30.4 PG — SIGNIFICANT CHANGE UP (ref 27–34)
MCHC RBC-ENTMCNC: 35 G/DL — SIGNIFICANT CHANGE UP (ref 32–36)
MCV RBC AUTO: 87 FL — SIGNIFICANT CHANGE UP (ref 80–100)
MONOCYTES # BLD AUTO: 0.3 K/UL — SIGNIFICANT CHANGE UP (ref 0–0.9)
MONOCYTES NFR BLD AUTO: 11 % — SIGNIFICANT CHANGE UP (ref 2–14)
NEUTROPHILS # BLD AUTO: 1 K/UL — LOW (ref 1.8–7.4)
NEUTROPHILS NFR BLD AUTO: 46 % — SIGNIFICANT CHANGE UP (ref 43–77)
NEUTS BAND # BLD: 2 % — SIGNIFICANT CHANGE UP (ref 0–8)
PLAT MORPH BLD: NORMAL — SIGNIFICANT CHANGE UP
PLATELET # BLD AUTO: 194 K/UL — SIGNIFICANT CHANGE UP (ref 150–400)
RBC # BLD: 3.94 M/UL — LOW (ref 4.2–5.8)
RBC # FLD: 18.4 % — HIGH (ref 10.3–14.5)
RBC BLD AUTO: SIGNIFICANT CHANGE UP
WBC # BLD: 2.2 K/UL — LOW (ref 3.8–10.5)
WBC # FLD AUTO: 2.2 K/UL — LOW (ref 3.8–10.5)

## 2017-05-26 DIAGNOSIS — C94.80 OTHER SPECIFIED LEUKEMIAS NOT HAVING ACHIEVED REMISSION: ICD-10-CM

## 2017-07-13 ENCOUNTER — OUTPATIENT (OUTPATIENT)
Dept: OUTPATIENT SERVICES | Facility: HOSPITAL | Age: 82
LOS: 1 days | Discharge: HOME | End: 2017-07-13

## 2017-07-13 DIAGNOSIS — C94.80 OTHER SPECIFIED LEUKEMIAS NOT HAVING ACHIEVED REMISSION: ICD-10-CM

## 2017-07-17 PROBLEM — C94.80 LEUKEMIA CUTIS: Status: ACTIVE | Noted: 2017-05-25

## 2017-07-18 ENCOUNTER — RESULT REVIEW (OUTPATIENT)
Age: 82
End: 2017-07-18

## 2017-07-18 ENCOUNTER — APPOINTMENT (OUTPATIENT)
Dept: HEMATOLOGY ONCOLOGY | Facility: CLINIC | Age: 82
End: 2017-07-18

## 2017-07-18 VITALS
RESPIRATION RATE: 16 BRPM | DIASTOLIC BLOOD PRESSURE: 78 MMHG | TEMPERATURE: 98.2 F | SYSTOLIC BLOOD PRESSURE: 166 MMHG | WEIGHT: 174.83 LBS | BODY MASS INDEX: 28.1 KG/M2 | HEART RATE: 85 BPM | OXYGEN SATURATION: 96 %

## 2017-07-18 DIAGNOSIS — C94.80 OTHER SPECIFIED LEUKEMIAS NOT HAVING ACHIEVED REMISSION: ICD-10-CM

## 2017-07-18 DIAGNOSIS — L81.9 OTHER SPECIFIED LEUKEMIAS NOT HAVING ACHIEVED REMISSION: ICD-10-CM

## 2017-07-18 LAB
BASOPHILS # BLD AUTO: 0 K/UL — SIGNIFICANT CHANGE UP (ref 0–0.2)
BASOPHILS NFR BLD AUTO: 0.2 % — SIGNIFICANT CHANGE UP (ref 0–2)
EOSINOPHIL # BLD AUTO: 0.1 K/UL — SIGNIFICANT CHANGE UP (ref 0–0.5)
EOSINOPHIL NFR BLD AUTO: 2.2 % — SIGNIFICANT CHANGE UP (ref 0–6)
HCT VFR BLD CALC: 31.8 % — LOW (ref 39–50)
HGB BLD-MCNC: 11.4 G/DL — LOW (ref 13–17)
LYMPHOCYTES # BLD AUTO: 1 K/UL — SIGNIFICANT CHANGE UP (ref 1–3.3)
LYMPHOCYTES # BLD AUTO: 23.9 % — SIGNIFICANT CHANGE UP (ref 13–44)
MCHC RBC-ENTMCNC: 32.6 PG — SIGNIFICANT CHANGE UP (ref 27–34)
MCHC RBC-ENTMCNC: 35.7 G/DL — SIGNIFICANT CHANGE UP (ref 32–36)
MCV RBC AUTO: 91.4 FL — SIGNIFICANT CHANGE UP (ref 80–100)
MONOCYTES # BLD AUTO: 0.4 K/UL — SIGNIFICANT CHANGE UP (ref 0–0.9)
MONOCYTES NFR BLD AUTO: 9.4 % — SIGNIFICANT CHANGE UP (ref 2–14)
NEUTROPHILS # BLD AUTO: 2.8 K/UL — SIGNIFICANT CHANGE UP (ref 1.8–7.4)
NEUTROPHILS NFR BLD AUTO: 64.2 % — SIGNIFICANT CHANGE UP (ref 43–77)
PLATELET # BLD AUTO: 157 K/UL — SIGNIFICANT CHANGE UP (ref 150–400)
RBC # BLD: 3.49 M/UL — LOW (ref 4.2–5.8)
RBC # FLD: 14.8 % — HIGH (ref 10.3–14.5)
WBC # BLD: 4.4 K/UL — SIGNIFICANT CHANGE UP (ref 3.8–10.5)
WBC # FLD AUTO: 4.4 K/UL — SIGNIFICANT CHANGE UP (ref 3.8–10.5)

## 2017-07-20 LAB
ALBUMIN SERPL ELPH-MCNC: 3.7 G/DL
ALP BLD-CCNC: 123 U/L
ALT SERPL-CCNC: 10 U/L
ANION GAP SERPL CALC-SCNC: 17 MMOL/L
AST SERPL-CCNC: 17 U/L
BILIRUB SERPL-MCNC: 0.5 MG/DL
BUN SERPL-MCNC: 28 MG/DL
CALCIUM SERPL-MCNC: 9.8 MG/DL
CHLORIDE SERPL-SCNC: 105 MMOL/L
CO2 SERPL-SCNC: 21 MMOL/L
CREAT SERPL-MCNC: 1.74 MG/DL
GLUCOSE SERPL-MCNC: 122 MG/DL
POTASSIUM SERPL-SCNC: 4.3 MMOL/L
PROT SERPL-MCNC: 6.6 G/DL
SODIUM SERPL-SCNC: 143 MMOL/L

## 2021-06-14 NOTE — ED PROVIDER NOTE - CROS ED ENMT ALL NEG
Suman Mckinney with [facility] order faxed to [phone]. Order faxed. - - -

## 2021-07-25 NOTE — H&P ADULT. - NEGATIVE SKIN SYMPTOMS
If you are a smoker, it is important for your health to stop smoking. Please be aware that second hand smoke is also harmful. no dryness/no rash/no itching

## 2022-03-13 NOTE — H&P ADULT. - PROBLEM SELECTOR PROBLEM 8
Pt presents to ED with C/O chest pain that goes into the back. Pt states feeling short of breath and unable to take a deep breath as well.  
Nutrition, metabolism, and development symptoms

## 2022-10-20 NOTE — H&P ADULT. - ENMT
details… detailed exam mouth/pharynx Expected Date Of Service: 10/20/2022 Billing Type: Third-Party Bill Performing Laboratory: 0 Bill For Surgical Tray: no